# Patient Record
Sex: MALE | NOT HISPANIC OR LATINO | Employment: OTHER | URBAN - METROPOLITAN AREA
[De-identification: names, ages, dates, MRNs, and addresses within clinical notes are randomized per-mention and may not be internally consistent; named-entity substitution may affect disease eponyms.]

---

## 2023-04-23 ASSESSMENT — ENCOUNTER SYMPTOMS
NECK PAIN: 0
PND: 0
BLURRED VISION: 0
SWEATS: 0
PALPITATIONS: 0
ORTHOPNEA: 0
SHORTNESS OF BREATH: 0
HYPERTENSION: 1
HEADACHES: 0

## 2023-04-25 ENCOUNTER — OFFICE VISIT (OUTPATIENT)
Dept: PRIMARY CARE | Facility: CLINIC | Age: 68
End: 2023-04-25
Payer: MEDICARE

## 2023-04-25 VITALS
OXYGEN SATURATION: 98 % | TEMPERATURE: 98 F | WEIGHT: 190.4 LBS | HEIGHT: 72 IN | HEART RATE: 88 BPM | DIASTOLIC BLOOD PRESSURE: 78 MMHG | SYSTOLIC BLOOD PRESSURE: 140 MMHG | BODY MASS INDEX: 25.79 KG/M2

## 2023-04-25 DIAGNOSIS — J30.2 SEASONAL ALLERGIC RHINITIS, UNSPECIFIED TRIGGER: ICD-10-CM

## 2023-04-25 DIAGNOSIS — I10 HYPERTENSION, UNSPECIFIED TYPE: ICD-10-CM

## 2023-04-25 DIAGNOSIS — E11.9 DIABETES MELLITUS TYPE 2 WITHOUT RETINOPATHY (MULTI): Primary | ICD-10-CM

## 2023-04-25 LAB — POC HEMOGLOBIN A1C: 11.9 % (ref 4.2–6.5)

## 2023-04-25 PROCEDURE — 83036 HEMOGLOBIN GLYCOSYLATED A1C: CPT | Performed by: FAMILY MEDICINE

## 2023-04-25 PROCEDURE — 1159F MED LIST DOCD IN RCRD: CPT | Performed by: FAMILY MEDICINE

## 2023-04-25 PROCEDURE — 3078F DIAST BP <80 MM HG: CPT | Performed by: FAMILY MEDICINE

## 2023-04-25 PROCEDURE — 99214 OFFICE O/P EST MOD 30 MIN: CPT | Performed by: FAMILY MEDICINE

## 2023-04-25 PROCEDURE — 1160F RVW MEDS BY RX/DR IN RCRD: CPT | Performed by: FAMILY MEDICINE

## 2023-04-25 PROCEDURE — 3077F SYST BP >= 140 MM HG: CPT | Performed by: FAMILY MEDICINE

## 2023-04-25 RX ORDER — MONTELUKAST SODIUM 10 MG/1
10 TABLET ORAL NIGHTLY
Qty: 30 TABLET | Refills: 3 | Status: SHIPPED | OUTPATIENT
Start: 2023-04-25 | End: 2023-05-15 | Stop reason: ALTCHOICE

## 2023-04-25 RX ORDER — ASPIRIN 81 MG/1
TABLET ORAL EVERY 24 HOURS
COMMUNITY
Start: 2020-03-25

## 2023-04-25 RX ORDER — METFORMIN HYDROCHLORIDE 1000 MG/1
1000 TABLET ORAL 2 TIMES DAILY
COMMUNITY
End: 2023-12-11 | Stop reason: SDUPTHER

## 2023-04-25 RX ORDER — TRIAMCINOLONE ACETONIDE 1 MG/G
OINTMENT TOPICAL EVERY 12 HOURS
COMMUNITY
End: 2023-07-25 | Stop reason: SDUPTHER

## 2023-04-25 RX ORDER — HYDROCORTISONE 25 MG/G
OINTMENT TOPICAL EVERY 12 HOURS
COMMUNITY

## 2023-04-25 RX ORDER — LORATADINE 10 MG/1
10 TABLET ORAL DAILY
Qty: 30 TABLET | Refills: 5 | Status: SHIPPED | OUTPATIENT
Start: 2023-04-25 | End: 2023-05-15 | Stop reason: ALTCHOICE

## 2023-04-25 RX ORDER — ATORVASTATIN CALCIUM 10 MG/1
10 TABLET, FILM COATED ORAL
COMMUNITY
Start: 2022-01-21 | End: 2023-10-16

## 2023-04-25 RX ORDER — VIT C/E/ZN/COPPR/LUTEIN/ZEAXAN 250MG-90MG
1000 CAPSULE ORAL
COMMUNITY

## 2023-04-25 RX ORDER — GLIPIZIDE 10 MG/1
1 TABLET ORAL
COMMUNITY
Start: 2021-03-23 | End: 2023-11-23

## 2023-04-25 RX ORDER — KETOCONAZOLE 20 MG/G
CREAM TOPICAL EVERY 12 HOURS
COMMUNITY
End: 2023-05-15 | Stop reason: ALTCHOICE

## 2023-04-25 RX ORDER — AMLODIPINE BESYLATE 10 MG/1
1 TABLET ORAL DAILY
COMMUNITY
Start: 2020-09-26 | End: 2023-08-10

## 2023-04-25 ASSESSMENT — ENCOUNTER SYMPTOMS
PALPITATIONS: 0
ORTHOPNEA: 0
HEADACHES: 0
BLURRED VISION: 0
SWEATS: 0
HYPERTENSION: 1
NECK PAIN: 0
PND: 0
SHORTNESS OF BREATH: 0

## 2023-04-25 ASSESSMENT — PATIENT HEALTH QUESTIONNAIRE - PHQ9
1. LITTLE INTEREST OR PLEASURE IN DOING THINGS: NOT AT ALL
2. FEELING DOWN, DEPRESSED OR HOPELESS: NOT AT ALL
SUM OF ALL RESPONSES TO PHQ9 QUESTIONS 1 AND 2: 0

## 2023-04-25 ASSESSMENT — PAIN SCALES - GENERAL: PAINLEVEL: 2

## 2023-04-25 NOTE — PROGRESS NOTES
"Subjective   Patient ID: Riccardo Ferguson is a 67 y.o. male who presents for Follow-up (Follow up/allergies).    Hypertension  This is a recurrent problem. The current episode started more than 1 year ago. The problem has been gradually improving since onset. The problem is controlled. Pertinent negatives include no anxiety, blurred vision, chest pain, headaches, malaise/fatigue, neck pain, orthopnea, palpitations, peripheral edema, PND, shortness of breath or sweats. There are no associated agents to hypertension. Risk factors for coronary artery disease include diabetes mellitus and dyslipidemia. There are no compliance problems.       DMII:  taking meds as prescribed    Seasonal allergies: struggling with congestion, drainage    Review of Systems   Constitutional:  Negative for malaise/fatigue.   Eyes:  Negative for blurred vision.   Respiratory:  Negative for shortness of breath.    Cardiovascular:  Negative for chest pain, palpitations, orthopnea and PND.   Musculoskeletal:  Negative for neck pain.   Neurological:  Negative for headaches.       Objective   BP (!) 130/92 (BP Location: Left arm, Patient Position: Sitting, BP Cuff Size: Small adult)   Pulse 88   Temp 36.7 °C (98 °F) (Oral)   Ht 1.816 m (5' 11.5\")   Wt 86.4 kg (190 lb 6.4 oz)   SpO2 98%   BMI 26.19 kg/m²     Physical Exam  Constitutional:       Appearance: He is normal weight.   Eyes:      Extraocular Movements: Extraocular movements intact.      Pupils: Pupils are equal, round, and reactive to light.   Cardiovascular:      Rate and Rhythm: Normal rate and regular rhythm.      Heart sounds: No murmur heard.     No gallop.   Pulmonary:      Effort: Pulmonary effort is normal.      Breath sounds: Normal breath sounds.   Musculoskeletal:      Cervical back: Normal range of motion.   Neurological:      General: No focal deficit present.      Mental Status: He is alert and oriented to person, place, and time.         Assessment/Plan   Problem List " Items Addressed This Visit          Circulatory    HTN (hypertension)     Improved control   Continue current medication regimen            Endocrine/Metabolic    Diabetes mellitus type 2 without retinopathy (CMS/HCC) - Primary     Persistent elevation in A1C despite restarting diabetic medications  Will refer pt to pharmacy to get on a better agent such as Jardiance and ideally titrate off of Glipizide  Lifestyle modification discussed at length with focus on improving diet and increasing activity levels  Follow up in 3 months for repeat A1C         Relevant Orders    POCT glycosylated hemoglobin (Hb A1C) manually resulted       Other    Seasonal allergic rhinitis     Recommend antihistamine, nasal saline  Cover nasal passage when mowing grass  Try to mow in off hours  Follow up if not improving         Relevant Medications    loratadine (Claritin) 10 mg tablet    montelukast (Singulair) 10 mg tablet

## 2023-04-25 NOTE — ASSESSMENT & PLAN NOTE
Recommend antihistamine, nasal saline  Cover nasal passage when mowing grass  Try to mow in off hours  Follow up if not improving

## 2023-04-25 NOTE — ASSESSMENT & PLAN NOTE
Persistent elevation in A1C despite restarting diabetic medications  Will refer pt to pharmacy to get on a better agent such as Jardiance and ideally titrate off of Glipizide  Lifestyle modification discussed at length with focus on improving diet and increasing activity levels  Follow up in 3 months for repeat A1C

## 2023-05-15 ENCOUNTER — TELEMEDICINE (OUTPATIENT)
Dept: PHARMACY | Facility: HOSPITAL | Age: 68
End: 2023-05-15
Payer: MEDICARE

## 2023-05-15 DIAGNOSIS — E11.9 DIABETES MELLITUS TYPE 2 WITHOUT RETINOPATHY (MULTI): Primary | ICD-10-CM

## 2023-05-15 RX ORDER — DEXTROSE 4 G
TABLET,CHEWABLE ORAL
Qty: 1 EACH | Refills: 0 | Status: SHIPPED | OUTPATIENT
Start: 2023-05-15 | End: 2024-05-14

## 2023-05-15 RX ORDER — DAPAGLIFLOZIN 10 MG/1
10 TABLET, FILM COATED ORAL EVERY MORNING
Qty: 90 TABLET | Refills: 3 | Status: SHIPPED | OUTPATIENT
Start: 2023-05-15 | End: 2023-07-25 | Stop reason: SINTOL

## 2023-05-15 RX ORDER — IBUPROFEN 200 MG
1 CAPSULE ORAL EVERY MORNING
Qty: 100 STRIP | Refills: 3 | Status: SHIPPED | OUTPATIENT
Start: 2023-05-15 | End: 2023-08-13

## 2023-05-15 RX ORDER — LANCETS
1 EACH MISCELLANEOUS EVERY MORNING
Qty: 100 EACH | Refills: 3 | Status: SHIPPED | OUTPATIENT
Start: 2023-05-15 | End: 2023-08-13

## 2023-05-15 NOTE — PROGRESS NOTES
Subjective   Patient ID: Riccardo Ferguson is a 67 y.o. male who presents for Diabetes.    Referring Provider: Kriss Márquez MD     Diabetes  He presents for his initial diabetic visit. He has type 2 diabetes mellitus. The initial diagnosis of diabetes was made -2 years ago. His disease course has been worsening. Risk factors for coronary artery disease include dyslipidemia, hypertension and male sex. Current diabetic treatment includes oral agent (dual therapy). He is compliant with treatment all of the time. He is following a generally healthy diet. He participates in exercise daily. Eye exam is current.     Had insurance change, which led to about 120 days without medication, insurance started back up in January    Jog 3-5 miles daily or every other day. Membership at [employer] started     Diet: avoids beef and pork, denies sugar and salt, mostly eats veggie and fruit and seafood    No other previous diabetes meds    Does not currently check blood sugars. Rxs sent for testing supplies to [facility]. Will look into why this is delayed currently    Review of Systems   All other systems reviewed and are negative.      Objective     There were no vitals taken for this visit.     Labs  Lab Results   Component Value Date    BILITOT 0.5 01/19/2023    CALCIUM 9.4 01/19/2023    CO2 26 01/19/2023    CL 99 01/19/2023    CREATININE 0.90 01/19/2023    GLUCOSE 287 (H) 01/19/2023    ALKPHOS 91 01/19/2023    K 4.0 01/19/2023    PROT 8.2 01/19/2023     01/19/2023    AST 19 01/19/2023    ALT 35 01/19/2023    BUN 8 01/19/2023    ANIONGAP 15 01/19/2023    ALBUMIN 4.4 01/19/2023    GFRMALE >90 01/19/2023     Lab Results   Component Value Date    TRIG 129 03/10/2020    CHOL 170 03/10/2020    HDL 51.2 03/10/2020     Lab Results   Component Value Date    HGBA1C 11.9 (A) 04/25/2023       Current Outpatient Medications on File Prior to Visit   Medication Sig Dispense Refill    amLODIPine (Norvasc) 10 mg tablet Take 1 tablet (10 mg) by mouth once  daily.      aspirin 81 mg EC tablet once every 24 hours.      atorvastatin (Lipitor) 10 mg tablet Take 1 tablet (10 mg) by mouth once daily.      cholecalciferol (Vitamin D-3) 25 MCG (1000 UT) capsule Take 1 capsule (25 mcg) by mouth once daily.      glipiZIDE (Glucotrol) 10 mg tablet Take 1 tablet (10 mg) by mouth 2 times a day before meals.      hydrocortisone 2.5 % ointment every 12 hours.      metFORMIN (Glucophage) 1,000 mg tablet Take 1 tablet (1,000 mg) by mouth 2 times a day.      triamcinolone (Kenalog) 0.1 % ointment every 12 hours.      [DISCONTINUED] ketoconazole (NIZOral) 2 % cream every 12 hours.      [DISCONTINUED] loratadine (Claritin) 10 mg tablet Take 1 tablet (10 mg) by mouth once daily. (Patient not taking: Reported on 5/15/2023) 30 tablet 5    [DISCONTINUED] montelukast (Singulair) 10 mg tablet Take 1 tablet (10 mg) by mouth once daily at bedtime. 30 tablet 3     No current facility-administered medications on file prior to visit.        Assessment/Plan   Problem List Items Addressed This Visit          Endocrine/Metabolic    Diabetes mellitus type 2 without retinopathy (CMS/HCC) - Primary     It was a pleasure speaking with you today!  I just called the [facility] on [address] and they said that they do not have any prescriptions for testing supplies. I will send those over now. Once you get these testing supplies, please check your blood sugar once daily in the morning.   Our longterm goal is to get you off of the Glipizide. To do this, we are going to start Farxiga 10mg daily.   Our pharmacy will mail that out to you  Continue the Metformin and glipizide  Since A1C was so elevated, ok with continuing glipizide at current dose then lowering in future  Continue your successful diet and exercising!  Follow up in roughly 3 weeks.          Relevant Medications    blood-glucose meter misc    blood sugar diagnostic (Blood Glucose Test) strip    lancets misc    dapagliflozin (Farxiga) 10 mg    Other Relevant  Orders    Follow Up In [facility] - Pharmacy       Continue all meds under the continuation of care with the referring provider and clinical pharmacy team.

## 2023-05-15 NOTE — ASSESSMENT & PLAN NOTE
It was a pleasure speaking with you today!  I just called the [facility] on [address] and they said that they do not have any prescriptions for testing supplies. I will send those over now. Once you get these testing supplies, please check your blood sugar once daily in the morning.   Our longterm goal is to get you off of the Glipizide. To do this, we are going to start Farxiga 10mg daily.   Our pharmacy will mail that out to you  Continue the Metformin and glipizide  Since A1C was so elevated, ok with continuing glipizide at current dose then lowering in future  Continue your successful diet and exercising!  Follow up in roughly 3 weeks.

## 2023-05-26 ENCOUNTER — OFFICE VISIT (OUTPATIENT)
Dept: PRIMARY CARE | Facility: CLINIC | Age: 68
End: 2023-05-26
Payer: MEDICARE

## 2023-05-26 VITALS
RESPIRATION RATE: 18 BRPM | TEMPERATURE: 97.9 F | HEART RATE: 110 BPM | OXYGEN SATURATION: 99 % | SYSTOLIC BLOOD PRESSURE: 132 MMHG | WEIGHT: 180.8 LBS | DIASTOLIC BLOOD PRESSURE: 82 MMHG | HEIGHT: 71 IN | BODY MASS INDEX: 25.31 KG/M2

## 2023-05-26 DIAGNOSIS — J02.9 PHARYNGITIS, UNSPECIFIED ETIOLOGY: Primary | ICD-10-CM

## 2023-05-26 LAB — POC RAPID STREP: NEGATIVE

## 2023-05-26 PROCEDURE — 99214 OFFICE O/P EST MOD 30 MIN: CPT | Performed by: FAMILY MEDICINE

## 2023-05-26 PROCEDURE — 1160F RVW MEDS BY RX/DR IN RCRD: CPT | Performed by: FAMILY MEDICINE

## 2023-05-26 PROCEDURE — 87880 STREP A ASSAY W/OPTIC: CPT | Performed by: FAMILY MEDICINE

## 2023-05-26 PROCEDURE — 3075F SYST BP GE 130 - 139MM HG: CPT | Performed by: FAMILY MEDICINE

## 2023-05-26 PROCEDURE — 1036F TOBACCO NON-USER: CPT | Performed by: FAMILY MEDICINE

## 2023-05-26 PROCEDURE — 3079F DIAST BP 80-89 MM HG: CPT | Performed by: FAMILY MEDICINE

## 2023-05-26 PROCEDURE — 1159F MED LIST DOCD IN RCRD: CPT | Performed by: FAMILY MEDICINE

## 2023-05-26 RX ORDER — AZITHROMYCIN 250 MG/1
TABLET, FILM COATED ORAL
Qty: 6 TABLET | Refills: 0 | Status: SHIPPED | OUTPATIENT
Start: 2023-05-26 | End: 2023-07-25 | Stop reason: ALTCHOICE

## 2023-05-26 ASSESSMENT — ENCOUNTER SYMPTOMS: SORE THROAT: 1

## 2023-05-26 NOTE — PROGRESS NOTES
"Subjective   Patient ID: Riccardo Ferguson is a 67 y.o. male who presents for Sore Throat (HARD TO SWALLOW, EARS HURT, CLEAR MUCUS AND LIGHT COUGH ON SWALLOWING. HAS TO SLEEP UPRIGHT SINCE SUNDAY. SLIGHT FEVER AND WEAKNESS THAT HAS RESOLVED.).    Sore Throat        Started with body aches, sore throat about 5 days ago.  Now mostly sore throat and ear discomfort.  Taking OTC meds, not much relief.  No fever or chills.    Review of Systems   HENT:  Positive for sore throat.      Negative unless noted in HPI     Objective   /82 (BP Location: Left arm, Patient Position: Sitting)   Pulse 110   Temp 36.6 °C (97.9 °F) (Temporal)   Resp 18   Ht 1.816 m (5' 11.5\")   Wt 82 kg (180 lb 12.8 oz)   SpO2 99%   BMI 24.87 kg/m²     Physical Exam  Constitutional:       Appearance: Normal appearance.   HENT:      Right Ear: Tympanic membrane and ear canal normal.      Left Ear: Tympanic membrane and ear canal normal.      Mouth/Throat:      Pharynx: Oropharyngeal exudate and posterior oropharyngeal erythema present.   Eyes:      Conjunctiva/sclera: Conjunctivae normal.   Pulmonary:      Effort: Pulmonary effort is normal.      Breath sounds: Normal breath sounds.   Musculoskeletal:      Cervical back: Normal range of motion and neck supple.   Neurological:      Mental Status: He is alert.         Assessment/Plan   Problem List Items Addressed This Visit          Infectious/Inflammatory    Pharyngitis - Primary     Negative strep   Start treatment with Zpack  Avoid steroid secondary to DMII, elevated glucoses  Recommend symptomatic treatment including increased hydration, regular nasal saline, use of analgesics such as acetaminophen/ibuprofen   Follow up if symptoms fail to improve           Relevant Medications    azithromycin (Zithromax Z-Daniel) 250 mg tablet    Other Relevant Orders    POCT Rapid Strep A manually resulted          "

## 2023-05-26 NOTE — ASSESSMENT & PLAN NOTE
Negative strep   Start treatment with Zpack  Avoid steroid secondary to DMII, elevated glucoses  Recommend symptomatic treatment including increased hydration, regular nasal saline, use of analgesics such as acetaminophen/ibuprofen   Follow up if symptoms fail to improve

## 2023-06-06 ENCOUNTER — TELEMEDICINE (OUTPATIENT)
Dept: PHARMACY | Facility: HOSPITAL | Age: 68
End: 2023-06-06
Payer: MEDICARE

## 2023-06-06 DIAGNOSIS — E11.9 DIABETES MELLITUS TYPE 2 WITHOUT RETINOPATHY (MULTI): ICD-10-CM

## 2023-06-06 NOTE — PROGRESS NOTES
Subjective   Patient ID: Riccardo Ferguson is a 67 y.o. male who presents for Diabetes.    Referring Provider: Kriss Márquez MD     Diabetes  He presents for his follow-up diabetic visit. He has type 2 diabetes mellitus. He is compliant with treatment all of the time.     -pt states he has not received testing supplies yet. I called the [facility] and they said that they only need pt's Med B card. Pt informed me that he would drop that off at the pharmacy today    -pt started Farxiga but complained about developing a mild, itchy rash along trunk and legs. Pt denies breathing issues. Pt states this started after 2 days of taking Farxiga. Informed pt to immediately stop the farxiga and we will see how it improves over the next 3 days    Review of Systems    Objective     There were no vitals taken for this visit.     Labs  Lab Results   Component Value Date    BILITOT 0.5 01/19/2023    CALCIUM 9.4 01/19/2023    CO2 26 01/19/2023    CL 99 01/19/2023    CREATININE 0.90 01/19/2023    GLUCOSE 287 (H) 01/19/2023    ALKPHOS 91 01/19/2023    K 4.0 01/19/2023    PROT 8.2 01/19/2023     01/19/2023    AST 19 01/19/2023    ALT 35 01/19/2023    BUN 8 01/19/2023    ANIONGAP 15 01/19/2023    ALBUMIN 4.4 01/19/2023    GFRMALE >90 01/19/2023     Lab Results   Component Value Date    TRIG 129 03/10/2020    CHOL 170 03/10/2020    HDL 51.2 03/10/2020     Lab Results   Component Value Date    HGBA1C 11.9 (A) 04/25/2023       Current Outpatient Medications on File Prior to Visit   Medication Sig Dispense Refill    amLODIPine (Norvasc) 10 mg tablet Take 1 tablet (10 mg) by mouth once daily.      aspirin 81 mg EC tablet once every 24 hours.      atorvastatin (Lipitor) 10 mg tablet Take 1 tablet (10 mg) by mouth once daily.      azithromycin (Zithromax Z-Daniel) 250 mg tablet Take 2 tablets day 1, take 1 tablet day 2-5 6 tablet 0    blood sugar diagnostic (Blood Glucose Test) strip 1 strip once daily in the morning. 100 strip 3    blood-glucose  meter misc Use daily or as directed for monitoring of diabetes. 1 each 0    cholecalciferol (Vitamin D-3) 25 MCG (1000 UT) capsule Take 1 capsule (25 mcg) by mouth once daily.      dapagliflozin (Farxiga) 10 mg Take 1 tablet (10 mg) by mouth once daily in the morning. 90 tablet 3    glipiZIDE (Glucotrol) 10 mg tablet Take 1 tablet (10 mg) by mouth 2 times a day before meals.      hydrocortisone 2.5 % ointment every 12 hours.      lancets misc 1 each once daily in the morning. 100 each 3    metFORMIN (Glucophage) 1,000 mg tablet Take 1 tablet (1,000 mg) by mouth 2 times a day.      triamcinolone (Kenalog) 0.1 % ointment every 12 hours.       No current facility-administered medications on file prior to visit.        Assessment/Plan   Problem List Items Addressed This Visit          Endocrine/Metabolic    Diabetes mellitus type 2 without retinopathy (CMS/HCC)     Please stop your farxiga due to the rash. We were supposed to discuss on 6/9, but I left a message and have not heard back from you yet. Please call me at [phone].  Continue Glipizide and Metformin  If you have not done so already, please bring your Med B [facility] your [facility] to get your testing supplies.         Relevant Orders    Follow Up In [facility] - Pharmacy       Arpit Sun, PharmD    Continue all meds under the continuation of care with the referring provider and clinical pharmacy team.

## 2023-06-09 NOTE — ASSESSMENT & PLAN NOTE
Please stop your farxiga due to the rash. We were supposed to discuss on 6/9, but I left a message and have not heard back from you yet. Please call me at [phone].  Continue Glipizide and Metformin  If you have not done so already, please bring your Med B card [facility] your [facility] to get your testing supplies.

## 2023-07-25 ENCOUNTER — OFFICE VISIT (OUTPATIENT)
Dept: PRIMARY CARE | Facility: CLINIC | Age: 68
End: 2023-07-25
Payer: MEDICARE

## 2023-07-25 VITALS
TEMPERATURE: 98.2 F | BODY MASS INDEX: 26.65 KG/M2 | OXYGEN SATURATION: 94 % | HEIGHT: 71 IN | HEART RATE: 84 BPM | DIASTOLIC BLOOD PRESSURE: 84 MMHG | SYSTOLIC BLOOD PRESSURE: 118 MMHG | RESPIRATION RATE: 18 BRPM | WEIGHT: 190.4 LBS

## 2023-07-25 DIAGNOSIS — E78.5 HYPERLIPIDEMIA, UNSPECIFIED HYPERLIPIDEMIA TYPE: ICD-10-CM

## 2023-07-25 DIAGNOSIS — I10 PRIMARY HYPERTENSION: ICD-10-CM

## 2023-07-25 DIAGNOSIS — E11.9 DIABETES MELLITUS TYPE 2 WITHOUT RETINOPATHY (MULTI): Primary | ICD-10-CM

## 2023-07-25 DIAGNOSIS — L30.9 DERMATITIS: ICD-10-CM

## 2023-07-25 LAB
ALANINE AMINOTRANSFERASE (SGPT) (U/L) IN SER/PLAS: 28 U/L (ref 10–52)
ALBUMIN (G/DL) IN SER/PLAS: 4.4 G/DL (ref 3.4–5)
ALBUMIN (MG/L) IN URINE: 7.3 MG/L
ALBUMIN/CREATININE (UG/MG) IN URINE: 7.7 UG/MG CRT (ref 0–30)
ALKALINE PHOSPHATASE (U/L) IN SER/PLAS: 83 U/L (ref 33–136)
ANION GAP IN SER/PLAS: 18 MMOL/L (ref 10–20)
ASPARTATE AMINOTRANSFERASE (SGOT) (U/L) IN SER/PLAS: 15 U/L (ref 9–39)
BILIRUBIN TOTAL (MG/DL) IN SER/PLAS: 0.4 MG/DL (ref 0–1.2)
CALCIUM (MG/DL) IN SER/PLAS: 9.9 MG/DL (ref 8.6–10.6)
CARBON DIOXIDE, TOTAL (MMOL/L) IN SER/PLAS: 23 MMOL/L (ref 21–32)
CHLORIDE (MMOL/L) IN SER/PLAS: 104 MMOL/L (ref 98–107)
CHOLESTEROL (MG/DL) IN SER/PLAS: 179 MG/DL (ref 0–199)
CHOLESTEROL IN HDL (MG/DL) IN SER/PLAS: 55.3 MG/DL
CHOLESTEROL/HDL RATIO: 3.2
CREATININE (MG/DL) IN SER/PLAS: 1.07 MG/DL (ref 0.5–1.3)
CREATININE (MG/DL) IN URINE: 94.4 MG/DL (ref 20–370)
ERYTHROCYTE DISTRIBUTION WIDTH (RATIO) BY AUTOMATED COUNT: 14.4 % (ref 11.5–14.5)
ERYTHROCYTE MEAN CORPUSCULAR HEMOGLOBIN CONCENTRATION (G/DL) BY AUTOMATED: 32.6 G/DL (ref 32–36)
ERYTHROCYTE MEAN CORPUSCULAR VOLUME (FL) BY AUTOMATED COUNT: 94 FL (ref 80–100)
ERYTHROCYTES (10*6/UL) IN BLOOD BY AUTOMATED COUNT: 4.72 X10E12/L (ref 4.5–5.9)
GFR MALE: 76 ML/MIN/1.73M2
GLUCOSE (MG/DL) IN SER/PLAS: 179 MG/DL (ref 74–99)
HEMATOCRIT (%) IN BLOOD BY AUTOMATED COUNT: 44.5 % (ref 41–52)
HEMOGLOBIN (G/DL) IN BLOOD: 14.5 G/DL (ref 13.5–17.5)
LDL: 79 MG/DL (ref 0–99)
LEUKOCYTES (10*3/UL) IN BLOOD BY AUTOMATED COUNT: 5.4 X10E9/L (ref 4.4–11.3)
NON HDL CHOLESTEROL: 124 MG/DL
NRBC (PER 100 WBCS) BY AUTOMATED COUNT: 0 /100 WBC (ref 0–0)
PLATELETS (10*3/UL) IN BLOOD AUTOMATED COUNT: 252 X10E9/L (ref 150–450)
POC HEMOGLOBIN A1C: 9.6 % (ref 4.2–6.5)
POTASSIUM (MMOL/L) IN SER/PLAS: 4.2 MMOL/L (ref 3.5–5.3)
PROTEIN TOTAL: 7.4 G/DL (ref 6.4–8.2)
SODIUM (MMOL/L) IN SER/PLAS: 141 MMOL/L (ref 136–145)
TRIGLYCERIDE (MG/DL) IN SER/PLAS: 224 MG/DL (ref 0–149)
UREA NITROGEN (MG/DL) IN SER/PLAS: 13 MG/DL (ref 6–23)
VLDL: 45 MG/DL (ref 0–40)

## 2023-07-25 PROCEDURE — 1160F RVW MEDS BY RX/DR IN RCRD: CPT | Performed by: FAMILY MEDICINE

## 2023-07-25 PROCEDURE — 80053 COMPREHEN METABOLIC PANEL: CPT

## 2023-07-25 PROCEDURE — 3079F DIAST BP 80-89 MM HG: CPT | Performed by: FAMILY MEDICINE

## 2023-07-25 PROCEDURE — 3074F SYST BP LT 130 MM HG: CPT | Performed by: FAMILY MEDICINE

## 2023-07-25 PROCEDURE — 80061 LIPID PANEL: CPT

## 2023-07-25 PROCEDURE — 1125F AMNT PAIN NOTED PAIN PRSNT: CPT | Performed by: FAMILY MEDICINE

## 2023-07-25 PROCEDURE — 1159F MED LIST DOCD IN RCRD: CPT | Performed by: FAMILY MEDICINE

## 2023-07-25 PROCEDURE — 85027 COMPLETE CBC AUTOMATED: CPT

## 2023-07-25 PROCEDURE — 1036F TOBACCO NON-USER: CPT | Performed by: FAMILY MEDICINE

## 2023-07-25 PROCEDURE — 82043 UR ALBUMIN QUANTITATIVE: CPT

## 2023-07-25 PROCEDURE — 83036 HEMOGLOBIN GLYCOSYLATED A1C: CPT | Performed by: FAMILY MEDICINE

## 2023-07-25 PROCEDURE — 99214 OFFICE O/P EST MOD 30 MIN: CPT | Performed by: FAMILY MEDICINE

## 2023-07-25 PROCEDURE — 82570 ASSAY OF URINE CREATININE: CPT

## 2023-07-25 RX ORDER — TRIAMCINOLONE ACETONIDE 1 MG/G
OINTMENT TOPICAL EVERY 12 HOURS
Qty: 80 G | Refills: 0 | Status: SHIPPED | OUTPATIENT
Start: 2023-07-25 | End: 2023-11-23

## 2023-07-25 ASSESSMENT — ENCOUNTER SYMPTOMS
ABDOMINAL PAIN: 0
UNEXPECTED WEIGHT CHANGE: 0
HEADACHES: 0
SHORTNESS OF BREATH: 0
PALPITATIONS: 0

## 2023-07-25 NOTE — PROGRESS NOTES
"Subjective   Patient ID: Riccardo Ferguson is a 68 y.o. male who presents for Diabetes (THINGS ARE GOOD, FARXIGA BROKE THE PATIENT OUT AND GAVE HIM A SOUR STOMACH. IS THERE SOMETHING TO TAKE FOR THIS RASH-ITCHY TO THE TOUCH-BILAT ARMS).    HPI   DMII: trying to make lifestyle changes, has been more active and watching food intake, not checking sugars regularly, currently glucometer is with luggage that was lost with travel.      Tried Farxiga but after two days of medication developed a rash on his bilateral upper extremities and thighs.  He denied any other new exposures, no one else in the home with same rash.  Has had some improvement but still has some rash on bilateral extremities.    Review of Systems   Constitutional:  Negative for unexpected weight change.   Eyes:  Negative for visual disturbance.   Respiratory:  Negative for shortness of breath.    Cardiovascular:  Negative for chest pain, palpitations and leg swelling.   Gastrointestinal:  Negative for abdominal pain.   Skin:  Positive for rash.   Neurological:  Negative for headaches.       Objective   /84 (BP Location: Right arm, Patient Position: Sitting)   Pulse 84   Temp 36.8 °C (98.2 °F) (Oral)   Resp 18   Ht 1.816 m (5' 11.5\")   Wt 86.4 kg (190 lb 6.4 oz)   SpO2 94%   BMI 26.19 kg/m²     Physical Exam  Constitutional:       Appearance: He is normal weight.   Eyes:      Extraocular Movements: Extraocular movements intact.      Pupils: Pupils are equal, round, and reactive to light.   Cardiovascular:      Rate and Rhythm: Normal rate and regular rhythm.      Heart sounds: No murmur heard.     No gallop.   Pulmonary:      Effort: Pulmonary effort is normal.      Breath sounds: Normal breath sounds.   Musculoskeletal:      Cervical back: Normal range of motion.   Skin:     Comments: Bilateral upper arms there is a papular rash, scant in nature, no vesicles or lesions, no lesions in between digits   Neurological:      General: No focal deficit " present.      Mental Status: He is alert and oriented to person, place, and time.         Assessment/Plan   Problem List Items Addressed This Visit       Diabetes mellitus type 2 without retinopathy (CMS/HCC) - Primary     Improved control with lifestyle modification and previous regimen  Goal is still to get off of Glipizide  Will await 1-2 weeks once rash resolves and can consider alternative medication, might be candidate for Jardiance or Rybelsus   Check labs, microalbumin  Follow up in 3 months         Relevant Orders    Albumin , Urine Random    Lipid Panel    CBC    Comprehensive Metabolic Panel    POCT glycosylated hemoglobin (Hb A1C) manually resulted (Completed)    HTN (hypertension)     Well controlled  Continue meds  Check renal function, lipids         Hyperlipidemia     Due for lipids, will call with results         Dermatitis     Likely medication induced, atypical that still persistent due to short duration of therapy and cessation over a month ago  Will check some labs to rule out other causes  Trial of topical steroid cream, want to avoid oral steroids with DMII         Relevant Medications    triamcinolone (Kenalog) 0.1 % ointment

## 2023-07-25 NOTE — ASSESSMENT & PLAN NOTE
Improved control with lifestyle modification and previous regimen  Goal is still to get off of Glipizide  Will await 1-2 weeks once rash resolves and can consider alternative medication, might be candidate for Wilber or Tiffanie   Check labs, microalbumin  Follow up in 3 months

## 2023-07-25 NOTE — ASSESSMENT & PLAN NOTE
Likely medication induced, atypical that still persistent due to short duration of therapy and cessation over a month ago  Will check some labs to rule out other causes  Trial of topical steroid cream, want to avoid oral steroids with DMII

## 2023-08-10 DIAGNOSIS — I10 PRIMARY HYPERTENSION: Primary | ICD-10-CM

## 2023-08-10 RX ORDER — AMLODIPINE BESYLATE 10 MG/1
10 TABLET ORAL DAILY
Qty: 90 TABLET | Refills: 0 | Status: SHIPPED | OUTPATIENT
Start: 2023-08-10 | End: 2023-11-06

## 2023-08-18 DIAGNOSIS — E11.9 DIABETES MELLITUS TYPE 2 WITHOUT RETINOPATHY (MULTI): Primary | ICD-10-CM

## 2023-10-14 DIAGNOSIS — E78.5 HYPERLIPIDEMIA, UNSPECIFIED HYPERLIPIDEMIA TYPE: Primary | ICD-10-CM

## 2023-10-16 RX ORDER — ATORVASTATIN CALCIUM 10 MG/1
10 TABLET, FILM COATED ORAL NIGHTLY
Qty: 90 TABLET | Refills: 1 | Status: SHIPPED | OUTPATIENT
Start: 2023-10-16 | End: 2024-01-31

## 2023-10-31 ENCOUNTER — PHARMACY VISIT (OUTPATIENT)
Dept: PHARMACY | Facility: CLINIC | Age: 68
End: 2023-10-31
Payer: MEDICARE

## 2023-10-31 ENCOUNTER — OFFICE VISIT (OUTPATIENT)
Dept: PRIMARY CARE | Facility: CLINIC | Age: 68
End: 2023-10-31
Payer: MEDICARE

## 2023-10-31 VITALS
TEMPERATURE: 98.2 F | DIASTOLIC BLOOD PRESSURE: 90 MMHG | BODY MASS INDEX: 26.74 KG/M2 | OXYGEN SATURATION: 97 % | HEART RATE: 72 BPM | HEIGHT: 71 IN | WEIGHT: 191 LBS | SYSTOLIC BLOOD PRESSURE: 128 MMHG

## 2023-10-31 DIAGNOSIS — J01.91 ACUTE RECURRENT SINUSITIS, UNSPECIFIED LOCATION: ICD-10-CM

## 2023-10-31 DIAGNOSIS — I10 PRIMARY HYPERTENSION: ICD-10-CM

## 2023-10-31 DIAGNOSIS — E11.9 DIABETES MELLITUS TYPE 2 WITHOUT RETINOPATHY (MULTI): Primary | ICD-10-CM

## 2023-10-31 DIAGNOSIS — D17.1 LIPOMA OF FLANK: ICD-10-CM

## 2023-10-31 PROBLEM — J02.9 PHARYNGITIS: Status: RESOLVED | Noted: 2023-05-26 | Resolved: 2023-10-31

## 2023-10-31 LAB — POC HEMOGLOBIN A1C: 11 % (ref 4.2–6.5)

## 2023-10-31 PROCEDURE — 83036 HEMOGLOBIN GLYCOSYLATED A1C: CPT | Performed by: FAMILY MEDICINE

## 2023-10-31 PROCEDURE — 1036F TOBACCO NON-USER: CPT | Performed by: FAMILY MEDICINE

## 2023-10-31 PROCEDURE — RXMED WILLOW AMBULATORY MEDICATION CHARGE

## 2023-10-31 PROCEDURE — 99214 OFFICE O/P EST MOD 30 MIN: CPT | Performed by: FAMILY MEDICINE

## 2023-10-31 PROCEDURE — 3080F DIAST BP >= 90 MM HG: CPT | Performed by: FAMILY MEDICINE

## 2023-10-31 PROCEDURE — 1160F RVW MEDS BY RX/DR IN RCRD: CPT | Performed by: FAMILY MEDICINE

## 2023-10-31 PROCEDURE — 1126F AMNT PAIN NOTED NONE PRSNT: CPT | Performed by: FAMILY MEDICINE

## 2023-10-31 PROCEDURE — 3074F SYST BP LT 130 MM HG: CPT | Performed by: FAMILY MEDICINE

## 2023-10-31 PROCEDURE — 1159F MED LIST DOCD IN RCRD: CPT | Performed by: FAMILY MEDICINE

## 2023-10-31 RX ORDER — AZITHROMYCIN 250 MG/1
TABLET, FILM COATED ORAL
Qty: 6 TABLET | Refills: 0 | Status: SHIPPED | OUTPATIENT
Start: 2023-10-31 | End: 2023-11-06

## 2023-10-31 RX ORDER — DAPAGLIFLOZIN 10 MG/1
10 TABLET, FILM COATED ORAL DAILY
Qty: 90 TABLET | Refills: 1 | Status: SHIPPED | OUTPATIENT
Start: 2023-10-31 | End: 2024-02-06 | Stop reason: WASHOUT

## 2023-10-31 ASSESSMENT — PAIN SCALES - GENERAL: PAINLEVEL: 0-NO PAIN

## 2023-10-31 ASSESSMENT — PATIENT HEALTH QUESTIONNAIRE - PHQ9
2. FEELING DOWN, DEPRESSED OR HOPELESS: NOT AT ALL
1. LITTLE INTEREST OR PLEASURE IN DOING THINGS: NOT AT ALL
SUM OF ALL RESPONSES TO PHQ9 QUESTIONS 1 AND 2: 0

## 2023-10-31 NOTE — LETTER
Riccardo Ferguson  1955    10/31/2023    To Whom This May Concern:    My patient, Riccardo Ferguson, has multiple medical issues including Diabetes Mellitus Type 2 which requires him to take medication regularly throughout the day as well as take regular breaks for food and bathroom use.  Please accommodate these needs.    Should you have any questions please do not hesitate to call.    Thank you for your cooperation.    Sincerely,    Kriss Márquez MD

## 2023-10-31 NOTE — ASSESSMENT & PLAN NOTE
A1C elevated at 11 again  Will restart Farxiga, continue rest of current regimen  Discussed again importance of controlled sugars and implications for overall health  Continue close follow up  Also, did discuss that if he develops rash again with Farxiga then we can no longer use this agent and need to reevaluate regimen with pharmacy again

## 2023-10-31 NOTE — PROGRESS NOTES
"Subjective   Patient ID: Riccardo Ferguson is a 68 y.o. male who presents for Diabetes Mellitus.    HPI   DMII: taking meds as prescribed, not monitoring home sugars much, not making much changes in diet, would like to restart Farxiga, thinks rash was from something else    Sinusitis: having sinus congestion, drainage, tried OTCs without much relief    Has lipoma on right flank, still would like to get it evaluated    Review of Systems  Negative unless noted in HPI    Objective   /90 (BP Location: Left arm, Patient Position: Sitting, BP Cuff Size: Small adult)   Pulse 72   Temp 36.8 °C (98.2 °F) (Oral)   Ht 1.803 m (5' 11\")   Wt 86.6 kg (191 lb)   SpO2 97%   BMI 26.64 kg/m²     Physical Exam  Constitutional:       Appearance: Normal appearance.   Cardiovascular:      Rate and Rhythm: Normal rate and regular rhythm.   Pulmonary:      Effort: Pulmonary effort is normal.      Breath sounds: Normal breath sounds.   Skin:     Comments: Right flank with soft mobile subcutaneous lesion, measures about 3cm in length   Neurological:      General: No focal deficit present.      Mental Status: He is alert.   Psychiatric:         Mood and Affect: Mood normal.         Assessment/Plan   Problem List Items Addressed This Visit             ICD-10-CM    Diabetes mellitus type 2 without retinopathy (CMS/HCC) - Primary E11.9     A1C elevated at 11 again  Will restart Farxiga, continue rest of current regimen  Discussed again importance of controlled sugars and implications for overall health  Continue close follow up  Also, did discuss that if he develops rash again with Farxiga then we can no longer use this agent and need to reevaluate regimen with pharmacy again         Relevant Medications    dapagliflozin propanediol (Farxiga) 10 mg    Other Relevant Orders    POCT glycosylated hemoglobin (Hb A1C) manually resulted (Completed)    HTN (hypertension) I10     Well controlled  Continue current meds  Labs up to date         " Lipoma of flank D17.1     Discussed with pt that glucose would need to be better controlled prior to any procedure for lipoma  Continue to monitor         Acute recurrent sinusitis J01.91    Relevant Medications    azithromycin (Zithromax) 250 mg tablet

## 2023-10-31 NOTE — ASSESSMENT & PLAN NOTE
Discussed with pt that glucose would need to be better controlled prior to any procedure for lipoma  Continue to monitor

## 2023-11-06 DIAGNOSIS — I10 PRIMARY HYPERTENSION: ICD-10-CM

## 2023-11-06 RX ORDER — AMLODIPINE BESYLATE 10 MG/1
10 TABLET ORAL DAILY
Qty: 90 TABLET | Refills: 0 | Status: SHIPPED | OUTPATIENT
Start: 2023-11-06 | End: 2023-11-23

## 2023-12-11 DIAGNOSIS — E11.9 DIABETES MELLITUS TYPE 2 WITHOUT RETINOPATHY (MULTI): Primary | ICD-10-CM

## 2023-12-11 RX ORDER — METFORMIN HYDROCHLORIDE 1000 MG/1
1000 TABLET ORAL 2 TIMES DAILY
Qty: 180 TABLET | Refills: 1 | Status: SHIPPED | OUTPATIENT
Start: 2023-12-11 | End: 2024-06-07

## 2024-01-31 DIAGNOSIS — L30.9 DERMATITIS: ICD-10-CM

## 2024-01-31 DIAGNOSIS — E11.9 DIABETES MELLITUS TYPE 2 WITHOUT RETINOPATHY (MULTI): ICD-10-CM

## 2024-01-31 DIAGNOSIS — E78.5 HYPERLIPIDEMIA, UNSPECIFIED HYPERLIPIDEMIA TYPE: ICD-10-CM

## 2024-01-31 RX ORDER — TRIAMCINOLONE ACETONIDE 1 MG/G
OINTMENT TOPICAL EVERY 12 HOURS
Qty: 80 G | Refills: 0 | Status: SHIPPED | OUTPATIENT
Start: 2024-01-31 | End: 2024-02-06 | Stop reason: WASHOUT

## 2024-01-31 RX ORDER — ATORVASTATIN CALCIUM 10 MG/1
10 TABLET, FILM COATED ORAL NIGHTLY
Qty: 90 TABLET | Refills: 1 | Status: SHIPPED | OUTPATIENT
Start: 2024-01-31

## 2024-01-31 RX ORDER — GLIPIZIDE 10 MG/1
10 TABLET ORAL 2 TIMES DAILY
Qty: 180 TABLET | Refills: 0 | Status: SHIPPED | OUTPATIENT
Start: 2024-01-31 | End: 2024-03-07

## 2024-02-06 ENCOUNTER — OFFICE VISIT (OUTPATIENT)
Dept: PRIMARY CARE | Facility: CLINIC | Age: 69
End: 2024-02-06
Payer: MEDICARE

## 2024-02-06 VITALS
WEIGHT: 186 LBS | DIASTOLIC BLOOD PRESSURE: 90 MMHG | SYSTOLIC BLOOD PRESSURE: 158 MMHG | BODY MASS INDEX: 26.04 KG/M2 | OXYGEN SATURATION: 97 % | HEART RATE: 87 BPM | HEIGHT: 71 IN

## 2024-02-06 DIAGNOSIS — Z13.6 SCREENING FOR CARDIOVASCULAR CONDITION: ICD-10-CM

## 2024-02-06 DIAGNOSIS — Z12.5 SCREENING FOR PROSTATE CANCER: ICD-10-CM

## 2024-02-06 DIAGNOSIS — I10 PRIMARY HYPERTENSION: ICD-10-CM

## 2024-02-06 DIAGNOSIS — Z00.00 WELLNESS EXAMINATION: ICD-10-CM

## 2024-02-06 DIAGNOSIS — E11.9 DIABETES MELLITUS TYPE 2 WITHOUT RETINOPATHY (MULTI): Primary | ICD-10-CM

## 2024-02-06 DIAGNOSIS — Z13.1 DIABETES MELLITUS SCREENING: ICD-10-CM

## 2024-02-06 DIAGNOSIS — E11.9 DIABETES MELLITUS TYPE 2 WITHOUT RETINOPATHY (MULTI): ICD-10-CM

## 2024-02-06 DIAGNOSIS — Z00.00 ROUTINE GENERAL MEDICAL EXAMINATION AT HEALTH CARE FACILITY: Primary | ICD-10-CM

## 2024-02-06 LAB
ALBUMIN SERPL BCP-MCNC: 4.7 G/DL (ref 3.4–5)
ALP SERPL-CCNC: 82 U/L (ref 33–136)
ALT SERPL W P-5'-P-CCNC: 31 U/L (ref 10–52)
ANION GAP SERPL CALC-SCNC: 16 MMOL/L (ref 10–20)
AST SERPL W P-5'-P-CCNC: 16 U/L (ref 9–39)
BILIRUB SERPL-MCNC: 0.6 MG/DL (ref 0–1.2)
BUN SERPL-MCNC: 8 MG/DL (ref 6–23)
CALCIUM SERPL-MCNC: 10.2 MG/DL (ref 8.6–10.6)
CHLORIDE SERPL-SCNC: 102 MMOL/L (ref 98–107)
CHOLEST SERPL-MCNC: 146 MG/DL (ref 0–199)
CHOLESTEROL/HDL RATIO: 3.4
CO2 SERPL-SCNC: 27 MMOL/L (ref 21–32)
CREAT SERPL-MCNC: 0.92 MG/DL (ref 0.5–1.3)
EGFRCR SERPLBLD CKD-EPI 2021: >90 ML/MIN/1.73M*2
GLUCOSE SERPL-MCNC: 230 MG/DL (ref 74–99)
HDLC SERPL-MCNC: 43.3 MG/DL
LDLC SERPL CALC-MCNC: 82 MG/DL
NON HDL CHOLESTEROL: 103 MG/DL (ref 0–149)
POC HEMOGLOBIN A1C: 10.2 % (ref 4.2–6.5)
POTASSIUM SERPL-SCNC: 4.9 MMOL/L (ref 3.5–5.3)
PROT SERPL-MCNC: 7.3 G/DL (ref 6.4–8.2)
PSA SERPL-MCNC: 0.59 NG/ML
SODIUM SERPL-SCNC: 140 MMOL/L (ref 136–145)
TRIGL SERPL-MCNC: 102 MG/DL (ref 0–149)
VLDL: 20 MG/DL (ref 0–40)

## 2024-02-06 PROCEDURE — 3080F DIAST BP >= 90 MM HG: CPT | Performed by: FAMILY MEDICINE

## 2024-02-06 PROCEDURE — 1159F MED LIST DOCD IN RCRD: CPT | Performed by: FAMILY MEDICINE

## 2024-02-06 PROCEDURE — 36415 COLL VENOUS BLD VENIPUNCTURE: CPT

## 2024-02-06 PROCEDURE — G0439 PPPS, SUBSEQ VISIT: HCPCS | Performed by: FAMILY MEDICINE

## 2024-02-06 PROCEDURE — 80053 COMPREHEN METABOLIC PANEL: CPT

## 2024-02-06 PROCEDURE — 83036 HEMOGLOBIN GLYCOSYLATED A1C: CPT | Performed by: FAMILY MEDICINE

## 2024-02-06 PROCEDURE — 99214 OFFICE O/P EST MOD 30 MIN: CPT | Performed by: FAMILY MEDICINE

## 2024-02-06 PROCEDURE — 1036F TOBACCO NON-USER: CPT | Performed by: FAMILY MEDICINE

## 2024-02-06 PROCEDURE — 1126F AMNT PAIN NOTED NONE PRSNT: CPT | Performed by: FAMILY MEDICINE

## 2024-02-06 PROCEDURE — 1160F RVW MEDS BY RX/DR IN RCRD: CPT | Performed by: FAMILY MEDICINE

## 2024-02-06 PROCEDURE — 80061 LIPID PANEL: CPT

## 2024-02-06 PROCEDURE — 3077F SYST BP >= 140 MM HG: CPT | Performed by: FAMILY MEDICINE

## 2024-02-06 PROCEDURE — G0103 PSA SCREENING: HCPCS

## 2024-02-06 ASSESSMENT — ENCOUNTER SYMPTOMS
DEPRESSION: 0
LOSS OF SENSATION IN FEET: 0
HEADACHES: 0
ABDOMINAL PAIN: 0
SHORTNESS OF BREATH: 0
PALPITATIONS: 0
UNEXPECTED WEIGHT CHANGE: 0
OCCASIONAL FEELINGS OF UNSTEADINESS: 0

## 2024-02-06 ASSESSMENT — PATIENT HEALTH QUESTIONNAIRE - PHQ9
2. FEELING DOWN, DEPRESSED OR HOPELESS: NOT AT ALL
SUM OF ALL RESPONSES TO PHQ9 QUESTIONS 1 AND 2: 0
1. LITTLE INTEREST OR PLEASURE IN DOING THINGS: NOT AT ALL

## 2024-02-06 NOTE — ASSESSMENT & PLAN NOTE
Recommended screening guidelines addressed and orders placed as indicated by age and chronic conditions  Screening labs ordered, will call with results  Colonoscopy up to date  Continue to work on healthy lifestyle including well balanced diet, regular activity, limit alcohol, no tobacco products and safe sexual practices  Follow up annually

## 2024-02-06 NOTE — ASSESSMENT & PLAN NOTE
Elevated in office today but has not taken his medications, generally well controlled  Continue current regimen

## 2024-02-06 NOTE — PROGRESS NOTES
"Subjective   Reason for Visit: Riccardo Ferguson is an 68 y.o. male here for a Medicare Wellness visit.          Reviewed all medications by prescribing practitioner or clinical pharmacist (such as prescriptions, OTCs, herbal therapies and supplements) and documented in the medical record.    HPI  DMII: states that he was not able to take Farxiga because of ongoing side effects, does not like the medication.  Taking Metformin and glucotrol without any issues.   Does not monitor sugars.  Reports that he tries to maintain a healthy diet and stay active.    HTN: usually well controlled, did not take his medication today because of blood work, denies any chest pain, SOB or LE swelling    Patient Care Team:  Kriss Márquez MD as PCP - General (Family Medicine)     Review of Systems   Constitutional:  Negative for unexpected weight change.   Eyes:  Negative for visual disturbance.   Respiratory:  Negative for shortness of breath.    Cardiovascular:  Negative for chest pain, palpitations and leg swelling.   Gastrointestinal:  Negative for abdominal pain.   Neurological:  Negative for headaches.       Objective   Vitals:  /90 (BP Location: Right arm, Patient Position: Sitting, BP Cuff Size: Adult)   Pulse 87   Ht 1.803 m (5' 11\")   Wt 84.4 kg (186 lb)   SpO2 97%   BMI 25.94 kg/m²       Physical Exam  Constitutional:       Appearance: He is normal weight.   Eyes:      Extraocular Movements: Extraocular movements intact.      Pupils: Pupils are equal, round, and reactive to light.   Cardiovascular:      Rate and Rhythm: Normal rate and regular rhythm.   Pulmonary:      Effort: Pulmonary effort is normal.      Breath sounds: Normal breath sounds.   Abdominal:      General: Abdomen is flat.      Palpations: Abdomen is soft.   Musculoskeletal:      Cervical back: Normal range of motion.   Neurological:      General: No focal deficit present.      Mental Status: He is alert and oriented to person, place, and time. "   Psychiatric:         Mood and Affect: Mood normal.         Assessment/Plan   Problem List Items Addressed This Visit       Diabetes mellitus type 2 without retinopathy (CMS/HCC)    Current Assessment & Plan     Persistent uncontrolled DMII  Discussed other medications including GLP-1  Would like to stick with tablets at this time  Will trial Jardiance  Maybe a candidate for Rebylsus as well if not tolerating Jardiance  Will have pharmacy touch base with pt again  Focus on lifestyle changes  Follow up in 3 months         Relevant Medications    empagliflozin (Jardiance) 25 mg    HTN (hypertension)    Current Assessment & Plan     Elevated in office today but has not taken his medications, generally well controlled  Continue current regimen         Routine general medical examination at health care facility - Primary    Current Assessment & Plan     Recommended screening guidelines addressed and orders placed as indicated by age and chronic conditions  Screening labs ordered, will call with results  Colonoscopy up to date  Continue to work on healthy lifestyle including well balanced diet, regular activity, limit alcohol, no tobacco products and safe sexual practices  Follow up annually            Other Visit Diagnoses       Wellness examination        Relevant Orders    POCT glycosylated hemoglobin (Hb A1C) manually resulted (Completed)    Diabetes mellitus screening        Relevant Orders    Comprehensive Metabolic Panel    Screening for cardiovascular condition        Relevant Orders    Lipid panel    Screening for prostate cancer        Relevant Orders    Prostate Specific Antigen, Screen

## 2024-02-06 NOTE — ASSESSMENT & PLAN NOTE
Persistent uncontrolled DMII  Discussed other medications including GLP-1  Would like to stick with tablets at this time  Will trial Jardiance  Maybe a candidate for Rebylsus as well if not tolerating Jardiance  Will have pharmacy touch base with pt again  Focus on lifestyle changes  Follow up in 3 months

## 2024-02-21 DIAGNOSIS — E11.9 DIABETES MELLITUS TYPE 2 WITHOUT RETINOPATHY (MULTI): Primary | ICD-10-CM

## 2024-03-05 DIAGNOSIS — I10 PRIMARY HYPERTENSION: ICD-10-CM

## 2024-03-05 DIAGNOSIS — E11.9 DIABETES MELLITUS TYPE 2 WITHOUT RETINOPATHY (MULTI): ICD-10-CM

## 2024-03-06 RX ORDER — AMLODIPINE BESYLATE 10 MG/1
10 TABLET ORAL DAILY
Qty: 90 TABLET | Refills: 0 | Status: SHIPPED | OUTPATIENT
Start: 2024-03-06

## 2024-03-07 RX ORDER — GLIPIZIDE 10 MG/1
10 TABLET ORAL 2 TIMES DAILY
Qty: 180 TABLET | Refills: 0 | Status: SHIPPED | OUTPATIENT
Start: 2024-03-07

## 2024-03-12 DIAGNOSIS — E11.9 DIABETES MELLITUS TYPE 2 WITHOUT RETINOPATHY (MULTI): Primary | ICD-10-CM

## 2024-05-09 ENCOUNTER — OFFICE VISIT (OUTPATIENT)
Dept: PRIMARY CARE | Facility: CLINIC | Age: 69
End: 2024-05-09
Payer: MEDICARE

## 2024-05-09 VITALS
BODY MASS INDEX: 25.76 KG/M2 | SYSTOLIC BLOOD PRESSURE: 140 MMHG | DIASTOLIC BLOOD PRESSURE: 80 MMHG | HEART RATE: 73 BPM | HEIGHT: 71 IN | WEIGHT: 184 LBS | OXYGEN SATURATION: 97 %

## 2024-05-09 DIAGNOSIS — L30.9 DERMATITIS: Primary | ICD-10-CM

## 2024-05-09 DIAGNOSIS — E11.9 DIABETES MELLITUS TYPE 2 WITHOUT RETINOPATHY (MULTI): ICD-10-CM

## 2024-05-09 PROBLEM — Z00.00 ROUTINE GENERAL MEDICAL EXAMINATION AT HEALTH CARE FACILITY: Status: RESOLVED | Noted: 2024-02-06 | Resolved: 2024-05-09

## 2024-05-09 PROBLEM — J01.91 ACUTE RECURRENT SINUSITIS: Status: RESOLVED | Noted: 2023-10-31 | Resolved: 2024-05-09

## 2024-05-09 LAB — POC HEMOGLOBIN A1C: 10.2 % (ref 4.2–6.5)

## 2024-05-09 PROCEDURE — 3077F SYST BP >= 140 MM HG: CPT | Performed by: FAMILY MEDICINE

## 2024-05-09 PROCEDURE — 1159F MED LIST DOCD IN RCRD: CPT | Performed by: FAMILY MEDICINE

## 2024-05-09 PROCEDURE — 1160F RVW MEDS BY RX/DR IN RCRD: CPT | Performed by: FAMILY MEDICINE

## 2024-05-09 PROCEDURE — 99214 OFFICE O/P EST MOD 30 MIN: CPT | Performed by: FAMILY MEDICINE

## 2024-05-09 PROCEDURE — 1124F ACP DISCUSS-NO DSCNMKR DOCD: CPT | Performed by: FAMILY MEDICINE

## 2024-05-09 PROCEDURE — 83036 HEMOGLOBIN GLYCOSYLATED A1C: CPT | Performed by: FAMILY MEDICINE

## 2024-05-09 PROCEDURE — 3048F LDL-C <100 MG/DL: CPT | Performed by: FAMILY MEDICINE

## 2024-05-09 PROCEDURE — 3079F DIAST BP 80-89 MM HG: CPT | Performed by: FAMILY MEDICINE

## 2024-05-09 PROCEDURE — 1036F TOBACCO NON-USER: CPT | Performed by: FAMILY MEDICINE

## 2024-05-09 RX ORDER — TRIAMCINOLONE ACETONIDE 1 MG/G
OINTMENT TOPICAL 2 TIMES DAILY PRN
Qty: 80 G | Refills: 3 | Status: SHIPPED | OUTPATIENT
Start: 2024-05-09

## 2024-05-09 ASSESSMENT — PATIENT HEALTH QUESTIONNAIRE - PHQ9
1. LITTLE INTEREST OR PLEASURE IN DOING THINGS: NOT AT ALL
SUM OF ALL RESPONSES TO PHQ9 QUESTIONS 1 AND 2: 0
2. FEELING DOWN, DEPRESSED OR HOPELESS: NOT AT ALL

## 2024-05-09 ASSESSMENT — ENCOUNTER SYMPTOMS
LOSS OF SENSATION IN FEET: 0
DEPRESSION: 0
OCCASIONAL FEELINGS OF UNSTEADINESS: 0

## 2024-05-09 NOTE — PROGRESS NOTES
"Subjective   Patient ID: Riccardo Ferguson is a 68 y.o. male who presents for Follow-up.    HPI   Presents for DMII follow up. Reports that he has been watching his diet regularly, avoid carbs and sweets as well as red meat.  States that tries to do yoga regularly and get in 2-3 miles of movement daily. He does monitor home glucoses and has noticed that they are sporadic.    He has had intolerance to Farxiga, Jardiance and most recently Rybelsus.  Most of the reactions have been dermatitis.  Pt does not want to do injections or needles of any kind to address his diabetes.    He reports feeling well with no neuropathy, no vision changes.  No urinary symptoms.    Review of Systems  Negative unless noted in HPI    Objective   /80 (BP Location: Right arm, Patient Position: Sitting, BP Cuff Size: Adult)   Pulse 73   Ht 1.803 m (5' 11\")   Wt 83.5 kg (184 lb)   SpO2 97%   BMI 25.66 kg/m²     Physical Exam  Constitutional:       Appearance: He is normal weight.   Eyes:      Extraocular Movements: Extraocular movements intact.      Pupils: Pupils are equal, round, and reactive to light.   Cardiovascular:      Rate and Rhythm: Normal rate and regular rhythm.   Pulmonary:      Effort: Pulmonary effort is normal.      Breath sounds: Normal breath sounds.   Neurological:      General: No focal deficit present.      Mental Status: He is alert and oriented to person, place, and time.   Psychiatric:         Mood and Affect: Mood normal.         Assessment/Plan   Problem List Items Addressed This Visit             ICD-10-CM    Diabetes mellitus type 2 without retinopathy (Multi) E11.9     A1C remains significantly elevated   Extensive discussion with patient regarding glycemic control  Recommend insulin secondary to intolerance to many of the alternative medications, pt declines  Another options is Pioglitazone but will discuss pt case with clinical pharmacy team to see if any other medications with better benefits are an " options for him   Recommend nutrition referral, declines  Labs up to date  Follow up in 3 months or earlier if any issues         Relevant Orders    POCT glycosylated hemoglobin (Hb A1C) manually resulted (Completed)    Dermatitis - Primary L30.9    Relevant Medications    triamcinolone (Kenalog) 0.1 % ointment

## 2024-05-09 NOTE — ASSESSMENT & PLAN NOTE
A1C remains significantly elevated   Extensive discussion with patient regarding glycemic control  Recommend insulin secondary to intolerance to many of the alternative medications, pt declines  Another options is Pioglitazone but will discuss pt case with clinical pharmacy team to see if any other medications with better benefits are an options for him   Recommend nutrition referral, declines  Labs up to date  Follow up in 3 months or earlier if any issues

## 2024-05-14 DIAGNOSIS — E11.9 DIABETES MELLITUS TYPE 2 WITHOUT RETINOPATHY (MULTI): Primary | ICD-10-CM

## 2024-06-07 DIAGNOSIS — E11.9 DIABETES MELLITUS TYPE 2 WITHOUT RETINOPATHY (MULTI): ICD-10-CM

## 2024-06-07 RX ORDER — METFORMIN HYDROCHLORIDE 1000 MG/1
1000 TABLET ORAL 2 TIMES DAILY
Qty: 180 TABLET | Refills: 1 | Status: SHIPPED | OUTPATIENT
Start: 2024-06-07

## 2024-06-13 ENCOUNTER — APPOINTMENT (OUTPATIENT)
Dept: PHARMACY | Facility: HOSPITAL | Age: 69
End: 2024-06-13
Payer: MEDICARE

## 2024-07-28 DIAGNOSIS — E11.9 DIABETES MELLITUS TYPE 2 WITHOUT RETINOPATHY (MULTI): ICD-10-CM

## 2024-07-28 DIAGNOSIS — I10 PRIMARY HYPERTENSION: ICD-10-CM

## 2024-07-29 RX ORDER — AMLODIPINE BESYLATE 10 MG/1
10 TABLET ORAL DAILY
Qty: 90 TABLET | Refills: 0 | Status: SHIPPED | OUTPATIENT
Start: 2024-07-29

## 2024-07-29 RX ORDER — GLIPIZIDE 10 MG/1
10 TABLET ORAL 2 TIMES DAILY
Qty: 180 TABLET | Refills: 0 | Status: SHIPPED | OUTPATIENT
Start: 2024-07-29

## 2024-08-22 ENCOUNTER — APPOINTMENT (OUTPATIENT)
Dept: PRIMARY CARE | Facility: CLINIC | Age: 69
End: 2024-08-22
Payer: MEDICARE

## 2024-09-10 DIAGNOSIS — E11.9 DIABETES MELLITUS TYPE 2 WITHOUT RETINOPATHY (MULTI): ICD-10-CM

## 2024-09-10 DIAGNOSIS — E78.5 HYPERLIPIDEMIA, UNSPECIFIED HYPERLIPIDEMIA TYPE: ICD-10-CM

## 2024-09-10 DIAGNOSIS — I10 PRIMARY HYPERTENSION: ICD-10-CM

## 2024-09-10 RX ORDER — ATORVASTATIN CALCIUM 10 MG/1
10 TABLET, FILM COATED ORAL NIGHTLY
Qty: 90 TABLET | Refills: 1 | Status: SHIPPED | OUTPATIENT
Start: 2024-09-10

## 2024-09-10 RX ORDER — GLIPIZIDE 10 MG/1
10 TABLET ORAL 2 TIMES DAILY
Qty: 180 TABLET | Refills: 0 | Status: SHIPPED | OUTPATIENT
Start: 2024-09-10

## 2024-09-10 RX ORDER — AMLODIPINE BESYLATE 10 MG/1
10 TABLET ORAL DAILY
Qty: 90 TABLET | Refills: 0 | Status: SHIPPED | OUTPATIENT
Start: 2024-09-10

## 2024-10-06 DIAGNOSIS — E78.5 HYPERLIPIDEMIA, UNSPECIFIED HYPERLIPIDEMIA TYPE: ICD-10-CM

## 2024-10-07 RX ORDER — ATORVASTATIN CALCIUM 10 MG/1
10 TABLET, FILM COATED ORAL NIGHTLY
Qty: 90 TABLET | Refills: 1 | Status: SHIPPED | OUTPATIENT
Start: 2024-10-07

## 2024-10-23 ENCOUNTER — DOCUMENTATION (OUTPATIENT)
Dept: PRIMARY CARE | Facility: CLINIC | Age: 69
End: 2024-10-23
Payer: MEDICARE

## 2024-10-23 NOTE — PROGRESS NOTES
[facility] contacted Riccardo. He resides out of town now. Therefore, CCM identification is closed

## 2024-10-23 NOTE — PROGRESS NOTES
Patient identified for CCM program. Researched patient's chart for chronic care management. Will call patient to introduce the program and obtain consent for enrollment for chronic conditions.

## 2024-12-05 DIAGNOSIS — E11.9 DIABETES MELLITUS TYPE 2 WITHOUT RETINOPATHY (MULTI): ICD-10-CM

## 2024-12-05 RX ORDER — METFORMIN HYDROCHLORIDE 1000 MG/1
1000 TABLET ORAL 2 TIMES DAILY
Qty: 180 TABLET | Refills: 1 | Status: SHIPPED | OUTPATIENT
Start: 2024-12-05

## 2024-12-30 DIAGNOSIS — E11.9 DIABETES MELLITUS TYPE 2 WITHOUT RETINOPATHY (MULTI): ICD-10-CM

## 2024-12-30 RX ORDER — GLIPIZIDE 10 MG/1
10 TABLET ORAL 2 TIMES DAILY
Qty: 180 TABLET | Refills: 0 | Status: SHIPPED | OUTPATIENT
Start: 2024-12-30

## 2025-01-03 DIAGNOSIS — I10 PRIMARY HYPERTENSION: ICD-10-CM

## 2025-01-03 RX ORDER — AMLODIPINE BESYLATE 10 MG/1
10 TABLET ORAL DAILY
Qty: 30 TABLET | Refills: 0 | Status: SHIPPED | OUTPATIENT
Start: 2025-01-03

## 2025-01-08 DIAGNOSIS — I10 PRIMARY HYPERTENSION: ICD-10-CM

## 2025-01-08 RX ORDER — AMLODIPINE BESYLATE 10 MG/1
10 TABLET ORAL DAILY
Qty: 90 TABLET | Refills: 0 | Status: SHIPPED | OUTPATIENT
Start: 2025-01-08

## 2025-04-27 DIAGNOSIS — I10 PRIMARY HYPERTENSION: ICD-10-CM

## 2025-04-28 RX ORDER — AMLODIPINE BESYLATE 10 MG/1
10 TABLET ORAL DAILY
Qty: 90 TABLET | Refills: 0 | Status: SHIPPED | OUTPATIENT
Start: 2025-04-28

## 2025-07-28 DIAGNOSIS — I10 PRIMARY HYPERTENSION: ICD-10-CM

## 2025-07-28 DIAGNOSIS — E11.9 DIABETES MELLITUS TYPE 2 WITHOUT RETINOPATHY (MULTI): ICD-10-CM

## 2025-07-29 RX ORDER — METFORMIN HYDROCHLORIDE 1000 MG/1
1000 TABLET ORAL
Qty: 60 TABLET | Refills: 0 | Status: SHIPPED | OUTPATIENT
Start: 2025-07-29

## 2025-07-29 RX ORDER — AMLODIPINE BESYLATE 10 MG/1
10 TABLET ORAL DAILY
Qty: 30 TABLET | Refills: 0 | Status: SHIPPED | OUTPATIENT
Start: 2025-07-29

## 2025-07-29 RX ORDER — GLIPIZIDE 10 MG/1
10 TABLET ORAL
Qty: 60 TABLET | Refills: 0 | Status: SHIPPED | OUTPATIENT
Start: 2025-07-29

## 2025-08-28 DIAGNOSIS — E11.9 DIABETES MELLITUS TYPE 2 WITHOUT RETINOPATHY (MULTI): ICD-10-CM

## 2025-08-28 DIAGNOSIS — I10 PRIMARY HYPERTENSION: ICD-10-CM

## 2025-08-28 RX ORDER — AMLODIPINE BESYLATE 10 MG/1
10 TABLET ORAL DAILY
Qty: 30 TABLET | Refills: 0 | Status: SHIPPED | OUTPATIENT
Start: 2025-08-28

## 2025-08-28 RX ORDER — GLIPIZIDE 10 MG/1
10 TABLET ORAL
Qty: 60 TABLET | Refills: 0 | Status: SHIPPED | OUTPATIENT
Start: 2025-08-28

## 2025-08-28 RX ORDER — METFORMIN HYDROCHLORIDE 1000 MG/1
1000 TABLET ORAL
Qty: 60 TABLET | Refills: 0 | Status: SHIPPED | OUTPATIENT
Start: 2025-08-28